# Patient Record
Sex: FEMALE | URBAN - METROPOLITAN AREA
[De-identification: names, ages, dates, MRNs, and addresses within clinical notes are randomized per-mention and may not be internally consistent; named-entity substitution may affect disease eponyms.]

---

## 2019-03-14 ENCOUNTER — APPOINTMENT (RX ONLY)
Dept: URBAN - METROPOLITAN AREA CLINIC 74 | Facility: CLINIC | Age: 54
Setting detail: DERMATOLOGY
End: 2019-03-14

## 2019-03-14 DIAGNOSIS — Z41.9 ENCOUNTER FOR PROCEDURE FOR PURPOSES OTHER THAN REMEDYING HEALTH STATE, UNSPECIFIED: ICD-10-CM

## 2019-03-14 PROCEDURE — ? PRODUCT LINE (OBAGI)

## 2019-03-14 PROCEDURE — ? BOTOX (U OR CC)

## 2019-03-14 PROCEDURE — ? TREATMENT REGIMEN

## 2019-03-14 PROCEDURE — ? PRODUCT LINE (REVISION)

## 2019-03-14 ASSESSMENT — LOCATION DETAILED DESCRIPTION DERM
LOCATION DETAILED: LEFT FOREHEAD
LOCATION DETAILED: SUPERIOR MID FOREHEAD
LOCATION DETAILED: LEFT LATERAL CANTHUS
LOCATION DETAILED: LEFT LATERAL FOREHEAD
LOCATION DETAILED: RIGHT INFERIOR TEMPLE
LOCATION DETAILED: RIGHT FOREHEAD
LOCATION DETAILED: GLABELLA
LOCATION DETAILED: RIGHT INFERIOR MEDIAL FOREHEAD
LOCATION DETAILED: LEFT INFERIOR MEDIAL FOREHEAD

## 2019-03-14 ASSESSMENT — LOCATION SIMPLE DESCRIPTION DERM
LOCATION SIMPLE: RIGHT FOREHEAD
LOCATION SIMPLE: RIGHT TEMPLE
LOCATION SIMPLE: GLABELLA
LOCATION SIMPLE: SUPERIOR FOREHEAD
LOCATION SIMPLE: LEFT FOREHEAD
LOCATION SIMPLE: LEFT EYELID

## 2019-03-14 ASSESSMENT — LOCATION ZONE DERM
LOCATION ZONE: EYELID
LOCATION ZONE: FACE

## 2019-03-14 NOTE — PROCEDURE: PRODUCT LINE (REVISION)
Product 2 Price (In Dollars - Numeric Only, No Special Characters Or $): 148.35
Product 10 Units: 0
Product 41 Price (In Dollars - Numeric Only, No Special Characters Or $): 0.00
Product 5 Price (In Dollars - Numeric Only, No Special Characters Or $): 93.15
Product 2 Units: 1
Detail Level: Zone
Product 2 Application Directions: Apply nightly to neck and décolletage
Send Charges To Patient Encounter: Yes
Product 5 Application Directions: Apply twice daily
Name Of Product 3: Revision intellishade TruPhysical SPF 45
Product 3 Price (In Dollars - Numeric Only, No Special Characters Or $): 74.75
Name Of Product 1: Revision Intellishade Matte
Product 3 Application Directions: Apply daily to face
Name Of Product 4: Revision Intellishade Original SPF 45
Name Of Product 2: Nectifirm Advanced
Name Of Product 5: [facility]

## 2019-03-14 NOTE — PROCEDURE: PRODUCT LINE (OBAGI)
Product 34 Price (In Dollars - Numeric Only, No Special Characters Or $): 0.00
Product 9 Units: 1
Product 23 Units: 0
Product 3 Price (In Dollars - Numeric Only, No Special Characters Or $): 103
Product 6 Application Directions: Apply daily
Name Of Product 4: Tretinoin .05% cream
Product 1 Price (In Dollars - Numeric Only, No Special Characters Or $): 45
Name Of Product 7: Obagi elastiderm eye cream
Product 3 Application Directions: Apply nightly blended with Tretinoin.
Name Of Product 10: Elastiderm eye serum
Product 7 Price (In Dollars - Numeric Only, No Special Characters Or $): 128
Product 1 Application Directions: Apply every other night, increase to nightly as tolerated
Detail Level: Zone
Product 10 Price (In Dollars - Numeric Only, No Special Characters Or $): 128.80
Product 4 Application Directions: Apply every other night, increase as tolerated
Name Of Product 2: Obagi clarifying serum
Name Of Product 5: Obagi Vitamin C kit
Allow Plan To Count Towards E/M Coding: Yes
Product 10 Application Directions: Apply daily around eyes
Product 2 Price (In Dollars - Numeric Only, No Special Characters Or $): 123
Name Of Product 8: Obagi nu derm trial kit normal to dry
Product 5 Price (In Dollars - Numeric Only, No Special Characters Or $): 299.20
Name Of Product 11: Obagi matte SPF 50
Product 8 Price (In Dollars - Numeric Only, No Special Characters Or $): 183.45
Product 2 Application Directions: Apply every am for pigmentation.
Product 11 Price (In Dollars - Numeric Only, No Special Characters Or $): 59.23
Product 5 Application Directions: Follow steps as instructed
Product 8 Application Directions: Apply morning and night as directed
Name Of Product 6: Obagi warm tinted sunscreen 50 SPF
Name Of Product 9: Obagi - C cleansing gel
Product 9 Price (In Dollars - Numeric Only, No Special Characters Or $): 48.30
Name Of Product 3: Obagi 
Name Of Product 1: Obagi Tretinoin .1%